# Patient Record
Sex: FEMALE | Race: WHITE | Employment: STUDENT | URBAN - METROPOLITAN AREA
[De-identification: names, ages, dates, MRNs, and addresses within clinical notes are randomized per-mention and may not be internally consistent; named-entity substitution may affect disease eponyms.]

---
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
## 2025-02-03 ENCOUNTER — OFFICE VISIT (OUTPATIENT)
Dept: FAMILY MEDICINE CLINIC | Facility: CLINIC | Age: 20
End: 2025-02-03
Payer: COMMERCIAL

## 2025-02-03 VITALS
DIASTOLIC BLOOD PRESSURE: 72 MMHG | WEIGHT: 121.25 LBS | SYSTOLIC BLOOD PRESSURE: 116 MMHG | TEMPERATURE: 98 F | BODY MASS INDEX: 22.89 KG/M2 | HEIGHT: 61 IN | HEART RATE: 83 BPM | OXYGEN SATURATION: 99 % | RESPIRATION RATE: 18 BRPM

## 2025-02-03 DIAGNOSIS — R39.9 URINARY TRACT INFECTION SYMPTOMS: Primary | ICD-10-CM

## 2025-02-03 LAB
APPEARANCE: CLEAR
BILIRUBIN: NEGATIVE
GLUCOSE (URINE DIPSTICK): NEGATIVE MG/DL
KETONES (URINE DIPSTICK): NEGATIVE MG/DL
LEUKOCYTES: NEGATIVE
MULTISTIX LOT#: ABNORMAL NUMERIC
NITRITE, URINE: NEGATIVE
PH, URINE: 6 (ref 4.5–8)
PROTEIN (URINE DIPSTICK): NEGATIVE MG/DL
SPECIFIC GRAVITY: >=1.03 (ref 1–1.03)
URINE-COLOR: YELLOW
UROBILINOGEN,SEMI-QN: 0.2 MG/DL (ref 0–1.9)

## 2025-02-03 PROCEDURE — 87086 URINE CULTURE/COLONY COUNT: CPT | Performed by: NURSE PRACTITIONER

## 2025-02-03 RX ORDER — NITROFURANTOIN 25; 75 MG/1; MG/1
100 CAPSULE ORAL 2 TIMES DAILY
Qty: 10 CAPSULE | Refills: 0 | Status: SHIPPED | OUTPATIENT
Start: 2025-02-03 | End: 2025-02-08

## 2025-02-03 NOTE — PROGRESS NOTES
CHIEF COMPLAINT:     Chief Complaint   Patient presents with    Urinary Symptoms     Pain and pressure        HPI:   Jerrod Du is a 20 year old female who presents with symptoms of UTI. Patient reporting symptoms of urinary pressure, dysuria, feeling \"uncomfortable\" feeling suprapubic for several weeks.  Symptoms have been worsening since onset.  Treatments tried: probiotic, cranberry, increased fluids.  Associated symptoms: cramping in stomach, urinary frequency.  Patient denies flank pain, fever, hematuria, nausea, or vomiting. Patient denies genital discharge or itching. Patient denies new sexual partners in the last 3 months. Patient denies recent unprotected sexual intercourse.  Pt has 1 sexual partner but has abstained from intercourse for several weeks d/t these symptoms.  Saw PCP 1/28/25 for urinary symptoms, pt + covid on 1/28/25, negative urine culture, negative urine hcg   Pt states she has had menstrual bleeding for last 3 weeks    Current Outpatient Medications   Medication Sig Dispense Refill    nitrofurantoin monohydrate macro 100 MG Oral Cap Take 1 capsule (100 mg total) by mouth 2 (two) times daily for 5 days. 10 capsule 0      History reviewed. No pertinent past medical history.   Social History:  Social History     Socioeconomic History    Marital status: Single   Tobacco Use    Smoking status: Never    Smokeless tobacco: Never   Substance and Sexual Activity    Alcohol use: Never    Drug use: Never         REVIEW OF SYSTEMS:   GENERAL: See above  GI: See HPI.    : See HPI.      EXAM:   /72   Pulse 83   Temp 98.3 °F (36.8 °C) (Temporal)   Resp 18   Ht 5' 1\" (1.549 m)   Wt 121 lb 4.1 oz (55 kg)   LMP 01/13/2025   SpO2 99%   BMI 22.91 kg/m²   GENERAL: well developed, well nourished,in no apparent distress  CARDIO: RRR, no murmurs  LUNGS: clear to ausculation bilaterally, no wheezing or rhonchi  GI: BS present x 4.  No hepatosplenomegaly.  : mild suprapubic tenderness. No  bladder distention, or CVAT   PSYCH: pleasant mood and affect  NEURO: no focal deficits    Recent Results (from the past 24 hours)   Urine Dip, auto without Micro    Collection Time: 02/03/25  5:50 PM   Result Value Ref Range    Glucose Urine Negative Negative mg/dL    Bilirubin Urine Negative Negative    Ketones, UA Negative Negative - Trace mg/dL    Spec Gravity >=1.030 1.005 - 1.030    Blood Urine Moderate (A) Negative    PH Urine 6.0 5.0 - 8.0    Protein Urine Negative Negative - Trace mg/dL    Urobilinogen Urine 0.2 0.2 - 1.0 mg/dL    Nitrite Urine Negative Negative    Leukocyte Esterase Urine Negative Negative    APPEARANCE Clear Clear    Color Urine Yellow Yellow    Multistix Lot# [id number] Numeric    Multistix Expiration Date 9/30/25 Date         ASSESSMENT AND PLAN:   Jerrod Du is a 20 year old female presents with urinary symptoms.    ASSESSMENT:  Encounter Diagnosis   Name Primary?    Urinary tract infection symptoms Yes       PLAN: Meds as listed below.  Comfort measures as described in Patient Instructions. will send urine culture.     Meds & Refills for this Visit:  Requested Prescriptions     Signed Prescriptions Disp Refills    nitrofurantoin monohydrate macro 100 MG Oral Cap 10 capsule 0     Sig: Take 1 capsule (100 mg total) by mouth 2 (two) times daily for 5 days.       Risk and benefits of medication discussed.   Stressed importance of completing full course of antibiotic unless told otherwise.   The patient indicates understanding of these issues and agrees to the plan.  The patient is asked to see PCP in 3 days if not better.   Seek care immediately for new onset of fever, vomiting, worsening symptoms. May proceed to ED for worsening symptoms    There are no Patient Instructions on file for this visit.